# Patient Record
Sex: MALE | Race: OTHER | Employment: FULL TIME | URBAN - METROPOLITAN AREA
[De-identification: names, ages, dates, MRNs, and addresses within clinical notes are randomized per-mention and may not be internally consistent; named-entity substitution may affect disease eponyms.]

---

## 2022-07-27 ENCOUNTER — HOSPITAL ENCOUNTER (EMERGENCY)
Age: 72
Discharge: HOME | End: 2022-07-27
Attending: EMERGENCY MEDICINE
Payer: MEDICARE

## 2022-07-27 VITALS
RESPIRATION RATE: 15 BRPM | DIASTOLIC BLOOD PRESSURE: 65 MMHG | HEART RATE: 50 BPM | TEMPERATURE: 98 F | OXYGEN SATURATION: 98 % | SYSTOLIC BLOOD PRESSURE: 130 MMHG

## 2022-07-27 DIAGNOSIS — S61.317A LACERATION OF LEFT LITTLE FINGER WITHOUT FOREIGN BODY WITH DAMAGE TO NAIL, INITIAL ENCOUNTER: Primary | ICD-10-CM

## 2022-07-27 PROCEDURE — 2500000003 HC RX 250 WO HCPCS: Performed by: PHYSICIAN ASSISTANT

## 2022-07-27 PROCEDURE — 12001 RPR S/N/AX/GEN/TRNK 2.5CM/<: CPT

## 2022-07-27 PROCEDURE — 99284 EMERGENCY DEPT VISIT MOD MDM: CPT

## 2022-07-27 PROCEDURE — 90471 IMMUNIZATION ADMIN: CPT | Performed by: PHYSICIAN ASSISTANT

## 2022-07-27 PROCEDURE — 6360000002 HC RX W HCPCS: Performed by: PHYSICIAN ASSISTANT

## 2022-07-27 PROCEDURE — 90715 TDAP VACCINE 7 YRS/> IM: CPT | Performed by: PHYSICIAN ASSISTANT

## 2022-07-27 RX ORDER — LIDOCAINE HYDROCHLORIDE 10 MG/ML
5 INJECTION, SOLUTION INFILTRATION; PERINEURAL ONCE
Status: COMPLETED | OUTPATIENT
Start: 2022-07-27 | End: 2022-07-27

## 2022-07-27 RX ADMIN — LIDOCAINE HYDROCHLORIDE 5 ML: 10 INJECTION, SOLUTION INFILTRATION; PERINEURAL at 13:22

## 2022-07-27 RX ADMIN — TETANUS TOXOID, REDUCED DIPHTHERIA TOXOID AND ACELLULAR PERTUSSIS VACCINE, ADSORBED 0.5 ML: 5; 2.5; 8; 8; 2.5 SUSPENSION INTRAMUSCULAR at 11:23

## 2022-07-27 ASSESSMENT — ENCOUNTER SYMPTOMS
NAUSEA: 0
VOMITING: 0

## 2022-07-27 ASSESSMENT — PAIN - FUNCTIONAL ASSESSMENT: PAIN_FUNCTIONAL_ASSESSMENT: NONE - DENIES PAIN

## 2022-07-27 NOTE — ED NOTES
Patient prepared for and ready to be discharged. Patient discharged at this time in no acute distress after verbalizing understanding of discharge instructions. Patient left after receiving After Visit Summary instructions.         Manuel Schaeffer RN  07/27/22 [id number]

## 2022-07-27 NOTE — ED PROVIDER NOTES
[address] ENCOUNTER          PHYSICIAN ASSISTANT NOTE       Date of evaluation: 7/27/2022    Chief Complaint     Laceration (Cut left pinky finger on knife while trying to get candle out )      History of Present Illness     Dalton Osman is a 71 y.o. right hand dominant male presents the emergency department with left fifth digit laceration sustained this morning accidentally with a knife. Bleeding is controlled. Patient denies any numbness or tingling, difficulty ranging the digit. He is unsure of his last tetanus vaccination. He is not on any blood thinners. Review of Systems     Review of Systems   Constitutional:  Negative for chills and fever. Gastrointestinal:  Negative for nausea and vomiting. Musculoskeletal:  Negative for gait problem. Skin:  Positive for wound. Neurological:  Negative for weakness and numbness. Psychiatric/Behavioral:  The patient is not nervous/anxious. Past Medical, Surgical, Family, and Social History     He has a past medical history of Alopecia areata, Hyperlipidemia, Myalgia and myositis, unspecified, Other and unspecified hyperlipidemia, Other specified congenital anomaly of skin, Special screening for malignant neoplasm of prostate, Thyroid disease, and Unspecified hypothyroidism. He has no past surgical history on file. His family history includes Mental Illness in his mother. He reports that he has never smoked. He has never used smokeless tobacco. He reports current alcohol use. He reports that he does not use drugs. Medications     Discharge Medication List as of 7/27/2022  1:12 PM        CONTINUE these medications which have NOT CHANGED    Details   pravastatin (PRAVACHOL) 20 MG tablet Take 1 tablet by mouth daily. , Disp-90 tablet, R-3      tacrolimus (PROTOPIC) 0.1 % ointment Apply  topically 2 times daily. , Topical, 2 TIMES DAILY, Until Discontinued, Historical Med      triamcinolone (KENALOG) 0.1 % ointment Apply topically daily. , Topical, DAILY Starting 7/20/2010, Until Discontinued, Historical Med             Allergies     He has No Known Allergies. Physical Exam     INITIAL VITALS: BP: 135/75,Temp: 98 °F (36.7 °C), Heart Rate: 50 (normal per pt // \"resting HR is 47\"), Resp: 15, SpO2: 98 %   Physical Exam  Vitals and nursing note reviewed. Constitutional:       General: He is not in acute distress. HENT:      Head: Normocephalic and atraumatic. Eyes:      Extraocular Movements: Extraocular movements intact. Conjunctiva/sclera: Conjunctivae normal.   Pulmonary:      Effort: Pulmonary effort is normal. No respiratory distress. Musculoskeletal:         General: No deformity. Cervical back: Neck supple. Comments: Intact range of motion at the MCP, PIP and DIP joint of the left fifth digit. Sensation is intact to light touch. 2+ radial pulse. Skin:     General: Skin is warm and dry. Comments: Lacerated flap of skin to the tip of the left fifth digit involving the distal portion of the nail. Bleeding is controlled. Neurological:      Mental Status: He is alert and oriented to person, place, and time. Psychiatric:         Mood and Affect: Mood normal.         Behavior: Behavior normal.       Diagnostic Results     RADIOLOGY:  No orders to display       LABS:   No results found for this visit on 07/27/22.     RECENT VITALS:  BP: 130/65, Temp: 98 °F (36.7 °C), Heart Rate: 50 (normal per pt // \"resting HR is 47\"),Resp: 15, SpO2: 98 %     Procedures     Lac Repair    Date/Time: 7/27/2022 4:10 PM  Performed by: Damien Merlos PA-C  Authorized by: Michaelle Gomez MD     Consent:     Consent obtained:  Verbal    Consent given by:  Patient    Risks discussed:  Pain, poor cosmetic result, need for additional repair and infection    Alternatives discussed:  No treatment  Universal protocol:     Procedure explained and questions answered to patient or proxy's satisfaction: yes      Patient identity confirmed:  Verbally with patient  Anesthesia:     Anesthesia method:  Nerve block    Block location:  Metacarpal block    Block needle gauge:  25 G    Block anesthetic:  Lidocaine 1% w/o epi    Block outcome:  Anesthesia achieved  Laceration details:     Location:  Finger    Finger location:  L small finger  Pre-procedure details:     Preparation:  Patient was prepped and draped in usual sterile fashion  Exploration:     Imaging outcome: foreign body not noted      Contaminated: no    Treatment:     Area cleansed with:  Saline    Amount of cleaning:  Standard  Skin repair:     Repair method:  Sutures    Suture size:  5-0    Suture material:  Prolene    Suture technique:  Simple interrupted    Number of sutures:  3  Approximation:     Approximation:  Close  Repair type:     Repair type:  Simple  Post-procedure details:     Dressing:  Non-adherent dressing and bulky dressing    Procedure completion:  Tolerated      ED Course     Nursing Notes, Past Medical Hx, Past Surgical Hx, Social Hx, Allergies, and Family Hx were reviewed. The patient was given the followingmedications:  Orders Placed This Encounter   Medications    Tetanus-Diphth-Acell Pertussis (BOOSTRIX) injection 0.5 mL    lidocaine 1 % injection 5 mL       CONSULTS:  None    MEDICAL DECISION MAKING / ASSESSMENT / Patricia Conrad is a 71 y.o. right-hand-dominant male who presents the emergency department with a left fifth digit laceration sustained while using a knife this morning. Patient denies any numbness or tingling. On exam, he has a lacerated flap of skin to the tip of the left fifth digit, involving the distal portion of the nail. Sensation is intact light touch. Intact range of motion at the MCP, PIP and DIP joint of the left fifth digit. 2+ radial pulse. Distal portion of nail was removed. Skin flap was tacked down with sutures per procedure note above. Wound was dressed with Adaptic and a bulky dressing.   Patient was instructed to keep dressing intact, clean and dry. He was instructed to follow up with hand surgery for re-evaluation and suture removal in 7 days. At this point in time, patient is stable for discharge. Patient was given strict return precautions as outlined in the AVS. Patient was agreeable and understanding to this plan of care. Prior to discharge, patient was ambulatory and PO tolerant. This patient was also evaluated by the attending physician. All care plans were discussed and agreed upon. Clinical Impression     1.  Laceration of left little finger without foreign body with damage to nail, initial encounter        Disposition     PATIENT REFERRED TO:  Ziggy Christianson MD  [address]    Schedule an appointment as soon as possible for a visit   (Hand surgery)    [facility] Emergency Department  [address] [place] [address]  [phone]    If symptoms worsen    DISCHARGE MEDICATIONS:  Discharge Medication List as of 7/27/2022  1:12 PM          DISPOSITION Decision To Discharge 07/27/2022 01:25:05 PM        Danial Mittal PA-C  07/27/22 [id number]

## 2022-07-27 NOTE — ED PROVIDER NOTES
ED Attending Attestation Note     Date of evaluation: 7/27/2022    This patient was seen by the advance practice provider. I have seen and examined the patient, agree with the workup, evaluation, management and diagnosis. The care plan has been discussed. My assessment reveals a 67-year-old male who presents with chief complaint of laceration. Patient cut his left pinky finger on a knife this morning. On exam has a distal tip avulsion of the left pinky finger, distal nail involved but proximal nailbed intact. No sign of bony injury. Normal range of motion of all joints of finger. I was present for key portions of laceration repair performed by [facility]. Jacqueline Rast Janalee Crigler, MD  07/27/22 [id number]

## 2022-10-19 DIAGNOSIS — N17.9 AKI (ACUTE KIDNEY INJURY) (HCC): ICD-10-CM

## 2022-10-19 LAB
ALBUMIN SERPL-MCNC: 4.9 G/DL (ref 3.4–5)
ANION GAP SERPL CALCULATED.3IONS-SCNC: 10 MMOL/L (ref 3–16)
BUN BLDV-MCNC: 11 MG/DL (ref 7–20)
CALCIUM SERPL-MCNC: 9.9 MG/DL (ref 8.3–10.6)
CHLORIDE BLD-SCNC: 100 MMOL/L (ref 99–110)
CO2: 28 MMOL/L (ref 21–32)
CREAT SERPL-MCNC: 1.2 MG/DL (ref 0.8–1.3)
CREATININE URINE: 40.4 MG/DL (ref 39–259)
GFR SERPL CREATININE-BSD FRML MDRD: >60 ML/MIN/{1.73_M2}
GLUCOSE BLD-MCNC: 89 MG/DL (ref 70–99)
MICROALBUMIN UR-MCNC: <1.2 MG/DL
MICROALBUMIN/CREAT UR-RTO: NORMAL MG/G (ref 0–30)
PHOSPHORUS: 3.2 MG/DL (ref 2.5–4.9)
POTASSIUM SERPL-SCNC: 5.2 MMOL/L (ref 3.5–5.1)
SODIUM BLD-SCNC: 138 MMOL/L (ref 136–145)

## 2022-10-21 ENCOUNTER — HOSPITAL ENCOUNTER (OUTPATIENT)
Dept: ULTRASOUND IMAGING | Age: 72
Discharge: HOME | End: 2022-10-21
Payer: MEDICARE

## 2022-10-21 DIAGNOSIS — N17.9 AKI (ACUTE KIDNEY INJURY) (HCC): ICD-10-CM

## 2022-10-21 LAB
KAPPA, FREE LIGHT CHAINS, SERUM: 10.52 MG/L (ref 3.3–19.4)
KAPPA/LAMBDA RATIO: 0.84 (ref 0.26–1.65)
KAPPA/LAMBDA TEST COMMENT: NORMAL
LAMBDA, FREE LIGHT CHAINS, SERUM: 12.57 MG/L (ref 5.71–26.3)

## 2022-10-21 PROCEDURE — 76770 US EXAM ABDO BACK WALL COMP: CPT

## 2023-01-03 ENCOUNTER — OFFICE VISIT (OUTPATIENT)
Dept: PULMONOLOGY | Age: 73
End: 2023-01-03
Payer: MEDICARE

## 2023-01-03 VITALS
BODY MASS INDEX: 27.03 KG/M2 | WEIGHT: 168.2 LBS | HEIGHT: 66 IN | DIASTOLIC BLOOD PRESSURE: 76 MMHG | TEMPERATURE: 97.7 F | SYSTOLIC BLOOD PRESSURE: 154 MMHG | OXYGEN SATURATION: 99 % | RESPIRATION RATE: 18 BRPM | HEART RATE: 57 BPM

## 2023-01-03 DIAGNOSIS — Z99.89 OSA ON CPAP: ICD-10-CM

## 2023-01-03 DIAGNOSIS — R05.3 PERSISTENT COUGH FOR 3 WEEKS OR LONGER: ICD-10-CM

## 2023-01-03 DIAGNOSIS — G47.33 OSA ON CPAP: ICD-10-CM

## 2023-01-03 DIAGNOSIS — R06.2 WHEEZING: ICD-10-CM

## 2023-01-03 DIAGNOSIS — J20.9 ACUTE BRONCHITIS, UNSPECIFIED ORGANISM: Primary | ICD-10-CM

## 2023-01-03 PROCEDURE — 1123F ACP DISCUSS/DSCN MKR DOCD: CPT | Performed by: INTERNAL MEDICINE

## 2023-01-03 PROCEDURE — 3017F COLORECTAL CA SCREEN DOC REV: CPT | Performed by: INTERNAL MEDICINE

## 2023-01-03 PROCEDURE — 1036F TOBACCO NON-USER: CPT | Performed by: INTERNAL MEDICINE

## 2023-01-03 PROCEDURE — 99204 OFFICE O/P NEW MOD 45 MIN: CPT | Performed by: INTERNAL MEDICINE

## 2023-01-03 PROCEDURE — G8427 DOCREV CUR MEDS BY ELIG CLIN: HCPCS | Performed by: INTERNAL MEDICINE

## 2023-01-03 PROCEDURE — G8417 CALC BMI ABV UP PARAM F/U: HCPCS | Performed by: INTERNAL MEDICINE

## 2023-01-03 PROCEDURE — G8484 FLU IMMUNIZE NO ADMIN: HCPCS | Performed by: INTERNAL MEDICINE

## 2023-01-03 RX ORDER — HYDROCODONE BITARTRATE AND HOMATROPINE METHYLBROMIDE ORAL SOLUTION 5; 1.5 MG/5ML; MG/5ML
5 LIQUID ORAL 4 TIMES DAILY PRN
Qty: 140 ML | Refills: 0 | Status: SHIPPED | OUTPATIENT
Start: 2023-01-03 | End: 2023-01-10

## 2023-01-03 RX ORDER — BENZONATATE 100 MG/1
100 CAPSULE ORAL 3 TIMES DAILY PRN
COMMUNITY
Start: 2023-01-01

## 2023-01-03 RX ORDER — M-VIT,TX,IRON,MINS/CALC/FOLIC 27MG-0.4MG
1 TABLET ORAL DAILY
COMMUNITY

## 2023-01-03 RX ORDER — PREDNISONE 20 MG/1
40 TABLET ORAL DAILY
Qty: 10 TABLET | Refills: 0 | Status: SHIPPED | OUTPATIENT
Start: 2023-01-03 | End: 2023-01-08

## 2023-01-03 ASSESSMENT — ENCOUNTER SYMPTOMS
COUGH: 1
BACK PAIN: 0
CHEST TIGHTNESS: 0
SINUS PRESSURE: 0
DIARRHEA: 0
VOICE CHANGE: 0
EYE REDNESS: 0
WHEEZING: 1
SORE THROAT: 0
CHOKING: 0
ABDOMINAL DISTENTION: 0
TROUBLE SWALLOWING: 0
NAUSEA: 0
APNEA: 0
BLOOD IN STOOL: 0
EYE DISCHARGE: 0
CONSTIPATION: 0
VOMITING: 0
RHINORRHEA: 0
STRIDOR: 0
SHORTNESS OF BREATH: 1
ABDOMINAL PAIN: 0
EYE ITCHING: 0

## 2023-01-03 NOTE — PROGRESS NOTES
MA Communication: The following orders are received by verbal communication from Anika Kellogg MD    Orders include:  Pt.  Has CT at [facility] 1/6/22       No FU at this time

## 2023-01-03 NOTE — PROGRESS NOTES
Pulmonary Outpatient Note   Courtney Martínez MD       1/3/2023    1. Acute bronchitis, unspecified organism    2. Persistent cough for 3 weeks or longer    3. Wheezing    4. TIFFANIE on CPAP          ASSESSMENT/PLAN:  Acute bronchitis. I suspect the patient has an acute viral syndrome with bronchitis, but cannot rule out other pathology. This test images were not available in EMR, but his wife had a picture on the phone. There is questionable elevation of the left hemidiaphragm. There may be slight prominence of the left hilum. Will await results of CT chest this Friday. I doubt this is a bacterial infection. Persistent cough. He does not have symptoms suggestive of pertussis. I told him cough and some viral syndromes can last several weeks and occasionally up to 3 months. He has severe cough that is disrupting his sleep, making it impossible for him to wear his CPAP. Suggested a trial of Hycodan. He says he does have dizziness with codeine. I suggested a small dose at bedtime so that he can rest.  He is having bilateral chest pain from coughing, is exhausted. Wheezing. Likely from acute bronchitis, will give him a short burst of prednisone and an albuterol inhaler. TIFFANIE on CPAP. Excellent compliance. I told him not using CPAP for a few days is not going to cause adverse effects. He can resume it as soon as he can tolerate it. Prophylaxis. Per PCP. It appears he has received his COVID-19 vaccines including the new bivalent COVID-19 vaccine, Pneumovax. RTC depending on findings on CT chest.        No orders of the defined types were placed in this encounter. No follow-ups on file. Chief Complaint:   New Patient and Cough (Chronic )       HPI: Mr. Armando Zimmerman is a 71y.o. year old male here for persistent cough. His PCP is Dr. You Crews. His nephrologist is Dr. Yamil Devi. Mr. Armando Zimmerman is a very pleasant 67-year-old male living with his wife in [place].   She accompanies him to the office today. The patient is a non-smoker, drinks 1 drink a week or less. The patient is retired, was in the [employer]. His father had CABG,  of old age. His mother had diabetes mellitus, hypertension, DJD of the knees. She also  of old age. He has a brother and 2 sisters, 1 sister  of diabetes mellitus and possibly associated lung disease. He does details. He has a son and a daughter who are healthy. The patient grew up in [place], came to the [address] [facility] in , completed his MIA[facility] and return to [place]. He then came back permanently to the [address] [facility] in  [facility]. The patient has a history of TIFFANIE, has been on CPAP for almost 30 years. He sleeps every night with it. He is followed by Dr. Arlet Pereira at [facility]. It appears he is on CPAP 6-10 cmH2O, has 100% compliance with AHI 0.6/h. He has CKD 3, has been followed by Dr. Erika Wyatt. He was diagnosed with hypertension in [facility], his blood pressure has been lower. He was recently changed from ACE inhibitor to calcium channel blocker (Norvasc 2.5 mg), felt poorly and felt dizzy. Medications were stopped. He has hyperlipidemia, alopecia, hypothyroidism. He was found to have low vitamin D levels. The patient has remained physically active, at one point played tennis, did yoga. The patient recently developed presumed viral URI. Around 2022 he began to develop a cough. Cough has persisted and is very severe. It is dry. He denies fever, chills, sweats. Cough occurs in the day, but it is much more at night. He is unable to sleep, cannot wear his CPAP in spite of trying to sleep in a recliner. He is exhausted from coughing, has bilateral chest pain. He was seen at an urgent care and then by his primary care physician. He was prescribed Tussi-Organidin, but could not find it in the pharmacy. He was later prescribed Tessalon Perles. The patient says he cannot even sleep at 10 minutes at a time, wakes up with coughing.   The CPAP is producing cough when he tries to exhale against the pressure. He denied travel or sick contacts, but they had visited elderly friends, not of them were sick. He has had no prior episodes of asthma, pneumonia. The patient also has wheezing at night, his wife sleeps in the same bed. After using benzonatate, he had 1 day where he slept for 2.5 hours. He has shortness of breath only associated with bouts of coughing. The patient does have a CT chest ordered, to be done at [facility] on Friday. The patient's wife says that he had a respiratory illness 9 years ago when he was at [facility]. He called for his family to pick him up and take him home. He says he had influenza A and possibly pneumonia. The patient denies GI or  complaints, has no vomiting, diarrhea. His appetite is fair, but he has not been able to eat much due to cough. He denies weight loss. The rest of his ROS was negative. Past Medical History:   Diagnosis Date    Alopecia areata 7/20/2010    Hyperlipidemia     Myalgia and myositis, unspecified 7/20/2010    Other and unspecified hyperlipidemia 7/20/2010    Other specified congenital anomaly of skin 7/20/2010    Special screening for malignant neoplasm of prostate 7/20/2010    Thyroid disease     Unspecified hypothyroidism 7/20/2010       No past surgical history on file.     Social History     Tobacco Use    Smoking status: Never    Smokeless tobacco: Never   Substance Use Topics    Alcohol use: Yes       Family History   Problem Relation Age of Onset    Mental Illness Mother          Current Outpatient Medications:     benzonatate (TESSALON) 100 MG capsule, Take 100 mg by mouth 3 times daily as needed, Disp: , Rfl:     Multiple Vitamins-Minerals (THERAPEUTIC MULTIVITAMIN-MINERALS) tablet, Take 1 tablet by mouth daily, Disp: , Rfl:     vitamin D (CHOLECALCIFEROL) 25 MCG (1000 UT) TABS tablet, Take 1,000 Units by mouth daily, Disp: , Rfl:     predniSONE (DELTASONE) 20 MG tablet, Take 2 tablets by mouth daily for 5 days, Disp: 10 tablet, Rfl: 0    HYDROcodone homatropine (HYCODAN) 5-1.5 MG/5ML solution, Take 5 mLs by mouth 4 times daily as needed (Cough) for up to 7 days. Max Daily Amount: 20 mLs, Disp: 140 mL, Rfl: 0    atorvastatin (LIPITOR) 20 MG tablet, Take 20 mg by mouth daily, Disp: , Rfl:     levothyroxine (SYNTHROID) 100 MCG tablet, Take 100 mcg by mouth daily, Disp: , Rfl:     Patient has no known allergies. Vitals:    01/03/23 1421   BP: (!) 154/76   Pulse: 57   Resp: 18   Temp: 97.7 °F (36.5 °C)   TempSrc: Temporal   SpO2: 99%   Weight: 168 lb 3.2 oz (76.3 kg)   Height: 5' 6\" (1.676 m)       Review of Systems   Constitutional:  Positive for activity change. Negative for appetite change, chills, diaphoresis, fatigue and fever. HENT:  Negative for congestion, nosebleeds, postnasal drip, rhinorrhea, sinus pressure, sneezing, sore throat, trouble swallowing and voice change. Eyes:  Negative for discharge, redness, itching and visual disturbance. Respiratory:  Positive for cough, shortness of breath and wheezing. Negative for apnea, choking, chest tightness and stridor. Chest wall pain   Cardiovascular:  Negative for chest pain, palpitations and leg swelling. Gastrointestinal:  Negative for abdominal distention, abdominal pain, blood in stool, constipation, diarrhea, nausea and vomiting. Endocrine: Negative for polyuria. Genitourinary:  Negative for decreased urine volume, difficulty urinating, dysuria, enuresis, frequency, hematuria and urgency. Musculoskeletal:  Negative for arthralgias, back pain, gait problem, joint swelling and myalgias. Skin:  Negative for rash. Allergic/Immunologic: Negative for environmental allergies and immunocompromised state. Neurological:  Negative for dizziness, tremors, seizures, weakness, light-headedness and headaches. Hematological:  Does not bruise/bleed easily. Psychiatric/Behavioral:  Positive for sleep disturbance. Negative for agitation, behavioral problems, confusion and hallucinations. All other systems reviewed and are negative. Physical Exam  Vitals reviewed. Constitutional:       General: He is not in acute distress. Appearance: He is well-developed. He is not diaphoretic. Comments: Very pleasant, mildly overweight   HENT:      Head: Normocephalic and atraumatic. Nose: Nose normal. No congestion or rhinorrhea. Mouth/Throat:      Mouth: Mucous membranes are moist.      Pharynx: Oropharynx is clear. No oropharyngeal exudate or posterior oropharyngeal erythema. Comments: Class III airway, dental repair  Eyes:      General: No scleral icterus. Right eye: No discharge. Left eye: No discharge. Conjunctiva/sclera: Conjunctivae normal.      Pupils: Pupils are equal, round, and reactive to light. Neck:      Thyroid: No thyromegaly. Vascular: No JVD. Trachea: No tracheal deviation. Cardiovascular:      Rate and Rhythm: Normal rate and regular rhythm. Heart sounds: Normal heart sounds. No murmur heard. No friction rub. No gallop. Pulmonary:      Effort: Pulmonary effort is normal. No respiratory distress. Breath sounds: No stridor. Wheezing present. No rhonchi or rales. Abdominal:      Palpations: Abdomen is soft. Tenderness: There is no abdominal tenderness. There is no guarding or rebound. Musculoskeletal:      Right lower leg: No edema. Left lower leg: No edema. Lymphadenopathy:      Cervical: No cervical adenopathy. Skin:     General: Skin is warm and dry. Coloration: Skin is not jaundiced or pale. Findings: No bruising, erythema, lesion or rash. Neurological:      General: No focal deficit present. Mental Status: He is alert and oriented to person, place, and time.       Comments: Detailed neurologic exam not performed   Psychiatric:         Mood and Affect: Mood normal.         Behavior: Behavior normal.

## 2023-01-03 NOTE — PATIENT INSTRUCTIONS
Remember to bring a list of pulmonary medications and any CPAP or BiPAP machines to your next appointment with the office.     Please keep all of your future appointments scheduled by [facility] office. Out of respect for other patients and providers, you may be asked to reschedule your appointment if you arrive later than your scheduled appointment time. Appointments cancelled less than 24hrs in advance will be considered a no show. Patients with three missed appointments within 1 year or four missed appointments within 2 years can be dismissed from the practice.     Please be aware that our physicians are required to work in the Intensive Care Unit at [facility].  Your appointment may need to be rescheduled if they are designated to work during your appointment time.      You may receive a survey regarding the care you received during your visit.  Your input is valuable to us.  We encourage you to complete and return your survey.  We hope you will choose us in the future for your healthcare needs.     Pt instructed of all future appointment dates & times, including radiology, labs, procedures & referrals. If procedures were scheduled preparation instructions provided. Instructions on future appointments with [facility] were given.

## 2023-01-06 ENCOUNTER — TELEPHONE (OUTPATIENT)
Dept: PULMONOLOGY | Age: 73
End: 2023-01-06

## 2023-01-06 LAB — CREAT SERPL-MCNC: 1.1 MG/DL (ref 0.7–1.3)

## 2023-01-06 NOTE — TELEPHONE ENCOUNTER
Pt called wanting  To look at images of CT and call him back. He can see on My Chart that it show no acute abnormal. However he wants a  To look at it and call back.

## 2023-01-10 ASSESSMENT — ENCOUNTER SYMPTOMS
CHEST TIGHTNESS: 0
EYE DISCHARGE: 0
BLOOD IN STOOL: 0
NAUSEA: 0
VOICE CHANGE: 0
APNEA: 0
EYE REDNESS: 0
RHINORRHEA: 0
ABDOMINAL DISTENTION: 0
TROUBLE SWALLOWING: 0
SORE THROAT: 0
SINUS PRESSURE: 0
COUGH: 1
CHOKING: 0
BACK PAIN: 0
DIARRHEA: 0
ABDOMINAL PAIN: 0
STRIDOR: 0
EYE ITCHING: 0
VOMITING: 0

## 2023-01-10 NOTE — PROGRESS NOTES
Pulmonary Outpatient Note   Yazmin Roberson MD       1/11/2023    1. Acute bronchitis, unspecified organism    2. Persistent cough for 3 weeks or longer    3. Wheezing    4. TIFFANIE on CPAP    5. Coronary artery calcification    6. Hyperlipidemia, unspecified hyperlipidemia type          ASSESSMENT/PLAN:  Acute bronchitis. Persistent cough: The patient has improved significantly, can stop the [facility] and Hycodan. I informed him that the cough could take several weeks to completely resolve. I do not believe he needs further work-up. His chest pain from excessive coughing has resolved. Wheezing. Resolved. TIFFANIE on CPAP. Excellent compliance. He was able to use CPAP for 7 hours last night  Coronary calcification, hyperlipidemia. Told him to discuss with his PCP about obtaining a coronary calcium score, ASCVD score calculation. His wife sees a cardiologist Dr. Ike Chadwick, he is willing to see Dr. Aaron Castillo. I suggested he discuss this with his PCP. He may be a candidate for higher dose of statin based on his ASCVD score. Prophylaxis. Per PCP. It appears he has received his COVID-19 vaccines including the new bivalent COVID-19 vaccine, Pneumovax. RTC as needed      No orders of the defined types were placed in this encounter. No follow-ups on file. Chief Complaint:   Follow-up, Cough, and Results (CT)       HPI: Mr. Bibiana Drummond is a 71y.o. year old male here for follow-up for persistent cough, wheezing,. His PCP is Dr. Keyana Montejo. His nephrologist is Dr. Neal Lugo. Mr. Karen Riggins was seen on 1/3/2023 for acute bronchitis with cough that was incessant, keeping him awake. He had to sit upright, could not wear his CPAP. He had wheezing. He was given a short burst of prednisone, and albuterol inhaler and Hycodan. The patient has gradually improved, last night was able to wear his CPAP for several hours. He coughs about once during the night, once or twice in the daytime.   He had mild constipation with the Hycodan, otherwise tolerated well. He questions whether he should continue [employer]. There is no nocturnal wheezing. He denies GI or  complaints. He otherwise feels well. There was no other change in his medical or surgical history, the rest of his ROS is negative. CT chest 2023 ([facility])  LUNGS/AIRWAYS:  Unremarkable. No air space disease or mass   PLEURA: Unremarkable. No pleural effusion or pneumothorax   MEDIASTINUM/JOSE:  Unremarkable   HEART/PERICARDIUM:  Moderate coronary artery calcifications   VESSELS:  Unremarkable. No aneurysm   CHEST WALL/LOWER NECK:  Unremarkable   UPPER ABDOMEN:  Small liver low densities, likely cysts/hemangiomas. BONES:  Unremarkable   OTHER:  None             IMPRESSION:           No acute abnormality. Previous notes reviewed and edited as necessary. Mr. Shelia Soares is a very pleasant 46-year-old male living with his wife in [place]. She accompanies him to the office today. The patient is a non-smoker, drinks 1 drink a week or less. The patient is retired, was in the [employer]. His father had CABG,  of old age. His mother had diabetes mellitus, hypertension, DJD of the knees. She also  of old age. He has a brother and 2 sisters, 1 sister  of diabetes mellitus and possibly associated lung disease. He does details. He has a son and a daughter who are healthy. The patient grew up in [facility], came to the [address] [place] [address] in , completed his MIA[facility] and return to [facility]. He then came back permanently to the [address] [place] [address],[address] in  [facility]. The patient has a history of TIFFANIE, has been on CPAP for almost 30 years. He sleeps every night with it. He is followed by Dr. Tiffanie Fuentes at [facility]. It appears he is on CPAP 6-10 cmH2O, has 100% compliance with AHI 0.6/h. He has CKD 3, has been followed by Dr. Won Bueno. He was diagnosed with hypertension in [facility], his blood pressure has been lower.   He was recently changed from ACE inhibitor to calcium channel blocker (Norvasc 2.5 mg), felt poorly and felt dizzy. Medications were stopped. He has hyperlipidemia, alopecia, hypothyroidism. He was found to have low vitamin D levels. The patient has remained physically active, at one point played tennis, did yoga. The patient recently developed presumed viral URI. Around 12/23/2022 he began to develop a cough. Cough has persisted and is very severe. It is dry. He denies fever, chills, sweats. Cough occurs in the day, but it is much more at night. He is unable to sleep, cannot wear his CPAP in spite of trying to sleep in a recliner. He is exhausted from coughing, has bilateral chest pain. He was seen at an urgent care and then by his primary care physician. He was prescribed Tussi-Organidin, but could not find it in the pharmacy. He was later prescribed Tessalon Perles. The patient says he cannot even sleep at 10 minutes at a time, wakes up with coughing. The CPAP is producing cough when he tries to exhale against the pressure. He denied travel or sick contacts, but they had visited elderly friends, not of them were sick. He has had no prior episodes of asthma, pneumonia. The patient also has wheezing at night, his wife sleeps in the same bed. After using benzonatate, he had 1 day where he slept for 2.5 hours. He has shortness of breath only associated with bouts of coughing. The patient does have a CT chest ordered, to be done at [facility] on Friday. The patient's wife says that he had a respiratory illness 9 years ago when he was at [facility]. He called for his family to pick him up and take him home. He says he had influenza A and possibly pneumonia. The patient denies GI or  complaints, has no vomiting, diarrhea. His appetite is fair, but he has not been able to eat much due to cough. He denies weight loss. The rest of his ROS was negative.     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 No pertinent surgical history. Social History     Tobacco Use    Smoking status: Never    Smokeless tobacco: Never   Substance Use Topics    Alcohol use: Yes       Family History   Problem Relation Age of Onset    Mental Illness Mother          Current Outpatient Medications:     atorvastatin (LIPITOR) 20 MG tablet, Take 20 mg by mouth daily, Disp: , Rfl:     albuterol sulfate HFA (VENTOLIN HFA) 108 (90 Base) MCG/ACT inhaler, Inhale 2 puffs into the lungs 4 times daily as needed for Wheezing, Disp: 54 g, Rfl: 1    benzonatate (TESSALON) 100 MG capsule, Take 100 mg by mouth 3 times daily as needed, Disp: , Rfl:     Multiple Vitamins-Minerals (THERAPEUTIC MULTIVITAMIN-MINERALS) tablet, Take 1 tablet by mouth daily, Disp: , Rfl:     vitamin D (CHOLECALCIFEROL) 25 MCG (1000 UT) TABS tablet, Take 1,000 Units by mouth daily, Disp: , Rfl:     levothyroxine (SYNTHROID) 100 MCG tablet, Take 100 mcg by mouth daily, Disp: , Rfl:     Patient has no known allergies. Vitals:    01/11/23 0959   BP: (!) 157/68   Pulse: 60   Resp: 16   Temp: 97.6 °F (36.4 °C)   TempSrc: Temporal   SpO2: 98%   Weight: 164 lb 6.4 oz (74.6 kg)   Height: 5' 6\" (1.676 m)       Review of Systems   Constitutional:  Positive for activity change. Negative for appetite change, chills, diaphoresis, fatigue and fever. HENT:  Negative for congestion, nosebleeds, postnasal drip, rhinorrhea, sinus pressure, sneezing, sore throat, trouble swallowing and voice change. Eyes:  Negative for discharge, redness, itching and visual disturbance. Respiratory:  Positive for cough.  Negative for apnea, choking, chest tightness, shortness of breath, wheezing and stridor. Chest wall pain   Cardiovascular:  Negative for chest pain, palpitations and leg swelling. Gastrointestinal:  Positive for constipation. Negative for abdominal distention, abdominal pain, blood in stool, diarrhea, nausea and vomiting. Endocrine: Negative for polyuria. Genitourinary:  Negative for decreased urine volume, difficulty urinating, dysuria, enuresis, frequency, hematuria and urgency. Musculoskeletal:  Negative for arthralgias, back pain, gait problem, joint swelling and myalgias. Skin:  Negative for rash. Allergic/Immunologic: Negative for environmental allergies and immunocompromised state. Neurological:  Negative for dizziness, tremors, seizures, weakness, light-headedness and headaches. Hematological:  Does not bruise/bleed easily. Psychiatric/Behavioral:  Positive for sleep disturbance. Negative for agitation, behavioral problems, confusion and hallucinations. All other systems reviewed and are negative. Physical Exam  Vitals reviewed. Constitutional:       General: He is not in acute distress. Appearance: He is well-developed. He is not ill-appearing, toxic-appearing or diaphoretic. Comments: Very pleasant, mildly overweight   HENT:      Head: Normocephalic and atraumatic. Nose: Nose normal. No congestion or rhinorrhea. Mouth/Throat:      Mouth: Mucous membranes are moist.      Pharynx: Oropharynx is clear. No oropharyngeal exudate or posterior oropharyngeal erythema. Comments: Class III airway, dental repair  Eyes:      General: No scleral icterus. Right eye: No discharge. Left eye: No discharge. Conjunctiva/sclera: Conjunctivae normal.      Comments: Glasses   Neck:      Thyroid: No thyromegaly. Vascular: No JVD. Trachea: No tracheal deviation. Cardiovascular:      Rate and Rhythm: Normal rate and regular rhythm. Heart sounds: Normal heart sounds. No murmur heard.     No friction rub. No gallop. Pulmonary:      Effort: Pulmonary effort is normal. No respiratory distress. Breath sounds: No stridor. No wheezing, rhonchi or rales. Musculoskeletal:      Right lower leg: No edema. Left lower leg: No edema. Lymphadenopathy:      Cervical: No cervical adenopathy. Skin:     General: Skin is warm and dry. Coloration: Skin is not jaundiced or pale. Findings: No bruising, erythema, lesion or rash. Neurological:      General: No focal deficit present. Mental Status: He is alert and oriented to person, place, and time.       Comments: Detailed neurologic exam not performed   Psychiatric:         Mood and Affect: Mood normal.         Behavior: Behavior normal.

## 2023-01-11 ENCOUNTER — OFFICE VISIT (OUTPATIENT)
Dept: PULMONOLOGY | Age: 73
End: 2023-01-11
Payer: MEDICARE

## 2023-01-11 VITALS
BODY MASS INDEX: 26.42 KG/M2 | SYSTOLIC BLOOD PRESSURE: 157 MMHG | HEART RATE: 60 BPM | WEIGHT: 164.4 LBS | DIASTOLIC BLOOD PRESSURE: 68 MMHG | OXYGEN SATURATION: 98 % | TEMPERATURE: 97.6 F | HEIGHT: 66 IN | RESPIRATION RATE: 16 BRPM

## 2023-01-11 DIAGNOSIS — Z99.89 OSA ON CPAP: ICD-10-CM

## 2023-01-11 DIAGNOSIS — I25.84 CORONARY ARTERY CALCIFICATION: ICD-10-CM

## 2023-01-11 DIAGNOSIS — R05.3 PERSISTENT COUGH FOR 3 WEEKS OR LONGER: ICD-10-CM

## 2023-01-11 DIAGNOSIS — J20.9 ACUTE BRONCHITIS, UNSPECIFIED ORGANISM: Primary | ICD-10-CM

## 2023-01-11 DIAGNOSIS — G47.33 OSA ON CPAP: ICD-10-CM

## 2023-01-11 DIAGNOSIS — E78.5 HYPERLIPIDEMIA, UNSPECIFIED HYPERLIPIDEMIA TYPE: ICD-10-CM

## 2023-01-11 DIAGNOSIS — R06.2 WHEEZING: ICD-10-CM

## 2023-01-11 DIAGNOSIS — I25.10 CORONARY ARTERY CALCIFICATION: ICD-10-CM

## 2023-01-11 PROCEDURE — G8484 FLU IMMUNIZE NO ADMIN: HCPCS | Performed by: INTERNAL MEDICINE

## 2023-01-11 PROCEDURE — 3017F COLORECTAL CA SCREEN DOC REV: CPT | Performed by: INTERNAL MEDICINE

## 2023-01-11 PROCEDURE — G8427 DOCREV CUR MEDS BY ELIG CLIN: HCPCS | Performed by: INTERNAL MEDICINE

## 2023-01-11 PROCEDURE — 1036F TOBACCO NON-USER: CPT | Performed by: INTERNAL MEDICINE

## 2023-01-11 PROCEDURE — G8417 CALC BMI ABV UP PARAM F/U: HCPCS | Performed by: INTERNAL MEDICINE

## 2023-01-11 PROCEDURE — 99213 OFFICE O/P EST LOW 20 MIN: CPT | Performed by: INTERNAL MEDICINE

## 2023-01-11 PROCEDURE — 1123F ACP DISCUSS/DSCN MKR DOCD: CPT | Performed by: INTERNAL MEDICINE

## 2023-01-11 RX ORDER — ATORVASTATIN CALCIUM 20 MG/1
20 TABLET, FILM COATED ORAL DAILY
COMMUNITY

## 2023-01-11 ASSESSMENT — ENCOUNTER SYMPTOMS
SHORTNESS OF BREATH: 0
WHEEZING: 0
CONSTIPATION: 1

## 2023-01-11 NOTE — PROGRESS NOTES
MA Communication:   The following orders are received by verbal communication from Sandra Tobar MD    Orders include: FU PRN

## 2023-01-11 NOTE — PATIENT INSTRUCTIONS
Remember to bring a list of pulmonary medications and any CPAP or BiPAP machines to your next appointment with the office. Please keep all of your future appointments scheduled by [address] office. Out of respect for other patients and providers, you may be asked to reschedule your appointment if you arrive later than your scheduled appointment time. Appointments cancelled less than 24hrs in advance will be considered a no show. Patients with three missed appointments within 1 year or four missed appointments within 2 years can be dismissed from the practice. Please be aware that our physicians are required to work in the Intensive Care Unit at [facility].  Your appointment may need to be rescheduled if they are designated to work during your appointment time. You may receive a survey regarding the care you received during your visit. Your input is valuable to us. We encourage you to complete and return your survey. We hope you will choose us in the future for your healthcare needs. Pt instructed of all future appointment dates & times, including radiology, labs, procedures & referrals. If procedures were scheduled preparation instructions provided. Instructions on future appointments with [facility] were given.